# Patient Record
Sex: FEMALE | Race: OTHER | NOT HISPANIC OR LATINO
[De-identification: names, ages, dates, MRNs, and addresses within clinical notes are randomized per-mention and may not be internally consistent; named-entity substitution may affect disease eponyms.]

---

## 2017-01-09 ENCOUNTER — ASOB RESULT (OUTPATIENT)
Age: 32
End: 2017-01-09

## 2017-01-09 ENCOUNTER — APPOINTMENT (OUTPATIENT)
Dept: ANTEPARTUM | Facility: CLINIC | Age: 32
End: 2017-01-09

## 2017-02-27 ENCOUNTER — ASOB RESULT (OUTPATIENT)
Age: 32
End: 2017-02-27

## 2017-02-27 ENCOUNTER — APPOINTMENT (OUTPATIENT)
Dept: ANTEPARTUM | Facility: CLINIC | Age: 32
End: 2017-02-27

## 2017-07-13 ENCOUNTER — INPATIENT (INPATIENT)
Facility: HOSPITAL | Age: 32
LOS: 2 days | Discharge: HOME | End: 2017-07-16
Attending: OBSTETRICS & GYNECOLOGY | Admitting: OBSTETRICS & GYNECOLOGY

## 2017-07-13 DIAGNOSIS — Z3A.00 WEEKS OF GESTATION OF PREGNANCY NOT SPECIFIED: ICD-10-CM

## 2017-07-13 DIAGNOSIS — O26.899 OTHER SPECIFIED PREGNANCY RELATED CONDITIONS, UNSPECIFIED TRIMESTER: ICD-10-CM

## 2017-07-14 VITALS — HEIGHT: 66 IN | WEIGHT: 179.9 LBS

## 2017-07-14 LAB
BASOPHILS # BLD AUTO: 0.02 K/UL — SIGNIFICANT CHANGE UP (ref 0–0.2)
BASOPHILS NFR BLD AUTO: 0.1 % — SIGNIFICANT CHANGE UP (ref 0–2)
BLD GP AB SCN SERPL QL: NEGATIVE — SIGNIFICANT CHANGE UP
EOSINOPHIL # BLD AUTO: 0.02 K/UL — SIGNIFICANT CHANGE UP (ref 0–0.5)
EOSINOPHIL NFR BLD AUTO: 0.1 % — SIGNIFICANT CHANGE UP (ref 0–6)
HCT VFR BLD CALC: 41 % — SIGNIFICANT CHANGE UP (ref 34.5–45)
HGB BLD-MCNC: 14.1 G/DL — SIGNIFICANT CHANGE UP (ref 11.5–15.5)
IMM GRANULOCYTES # BLD AUTO: 0.05 # — SIGNIFICANT CHANGE UP
IMM GRANULOCYTES NFR BLD AUTO: 0.3 % — SIGNIFICANT CHANGE UP (ref 0–1.5)
LYMPHOCYTES # BLD AUTO: 1.24 K/UL — SIGNIFICANT CHANGE UP (ref 1–3.3)
LYMPHOCYTES # BLD AUTO: 8.2 % — LOW (ref 13–44)
MCHC RBC-ENTMCNC: 31.4 PG — SIGNIFICANT CHANGE UP (ref 27–34)
MCHC RBC-ENTMCNC: 34.4 % — SIGNIFICANT CHANGE UP (ref 32–36)
MCV RBC AUTO: 91.3 FL — SIGNIFICANT CHANGE UP (ref 80–100)
MONOCYTES # BLD AUTO: 0.51 K/UL — SIGNIFICANT CHANGE UP (ref 0–0.9)
MONOCYTES NFR BLD AUTO: 3.4 % — SIGNIFICANT CHANGE UP (ref 2–14)
NEUTROPHILS # BLD AUTO: 13.35 K/UL — HIGH (ref 1.8–7.4)
NEUTROPHILS NFR BLD AUTO: 87.9 % — HIGH (ref 43–77)
NRBC # FLD: 0 — SIGNIFICANT CHANGE UP
PLATELET # BLD AUTO: 219 K/UL — SIGNIFICANT CHANGE UP (ref 150–400)
PMV BLD: 11.3 FL — SIGNIFICANT CHANGE UP (ref 7–13)
RBC # BLD: 4.49 M/UL — SIGNIFICANT CHANGE UP (ref 3.8–5.2)
RBC # FLD: 13.1 % — SIGNIFICANT CHANGE UP (ref 10.3–14.5)
RH IG SCN BLD-IMP: POSITIVE — SIGNIFICANT CHANGE UP
T PALLIDUM AB TITR SER: NEGATIVE — SIGNIFICANT CHANGE UP
WBC # BLD: 15.19 K/UL — HIGH (ref 3.8–10.5)
WBC # FLD AUTO: 15.19 K/UL — HIGH (ref 3.8–10.5)

## 2017-07-14 RX ORDER — PRAMOXINE HYDROCHLORIDE 150 MG/15G
1 AEROSOL, FOAM RECTAL EVERY 4 HOURS
Qty: 0 | Refills: 0 | Status: DISCONTINUED | OUTPATIENT
Start: 2017-07-14 | End: 2017-07-16

## 2017-07-14 RX ORDER — DOCUSATE SODIUM 100 MG
100 CAPSULE ORAL
Qty: 0 | Refills: 0 | Status: DISCONTINUED | OUTPATIENT
Start: 2017-07-14 | End: 2017-07-16

## 2017-07-14 RX ORDER — OXYTOCIN 10 UNIT/ML
41.67 VIAL (ML) INJECTION
Qty: 20 | Refills: 0 | Status: DISCONTINUED | OUTPATIENT
Start: 2017-07-14 | End: 2017-07-16

## 2017-07-14 RX ORDER — IBUPROFEN 200 MG
600 TABLET ORAL EVERY 6 HOURS
Qty: 0 | Refills: 0 | Status: COMPLETED | OUTPATIENT
Start: 2017-07-14 | End: 2018-06-12

## 2017-07-14 RX ORDER — CITRIC ACID/SODIUM CITRATE 300-500 MG
15 SOLUTION, ORAL ORAL EVERY 4 HOURS
Qty: 0 | Refills: 0 | Status: DISCONTINUED | OUTPATIENT
Start: 2017-07-14 | End: 2017-07-14

## 2017-07-14 RX ORDER — SODIUM CHLORIDE 9 MG/ML
1000 INJECTION, SOLUTION INTRAVENOUS ONCE
Qty: 0 | Refills: 0 | Status: COMPLETED | OUTPATIENT
Start: 2017-07-14 | End: 2017-07-14

## 2017-07-14 RX ORDER — KETOROLAC TROMETHAMINE 30 MG/ML
30 SYRINGE (ML) INJECTION ONCE
Qty: 0 | Refills: 0 | Status: DISCONTINUED | OUTPATIENT
Start: 2017-07-14 | End: 2017-07-14

## 2017-07-14 RX ORDER — HYDROCORTISONE 1 %
1 OINTMENT (GRAM) TOPICAL EVERY 4 HOURS
Qty: 0 | Refills: 0 | Status: DISCONTINUED | OUTPATIENT
Start: 2017-07-14 | End: 2017-07-16

## 2017-07-14 RX ORDER — AER TRAVELER 0.5 G/1
1 SOLUTION RECTAL; TOPICAL EVERY 4 HOURS
Qty: 0 | Refills: 0 | Status: DISCONTINUED | OUTPATIENT
Start: 2017-07-14 | End: 2017-07-14

## 2017-07-14 RX ORDER — GLYCERIN ADULT
1 SUPPOSITORY, RECTAL RECTAL AT BEDTIME
Qty: 0 | Refills: 0 | Status: DISCONTINUED | OUTPATIENT
Start: 2017-07-14 | End: 2017-07-16

## 2017-07-14 RX ORDER — SODIUM CHLORIDE 9 MG/ML
3 INJECTION INTRAMUSCULAR; INTRAVENOUS; SUBCUTANEOUS EVERY 8 HOURS
Qty: 0 | Refills: 0 | Status: DISCONTINUED | OUTPATIENT
Start: 2017-07-14 | End: 2017-07-16

## 2017-07-14 RX ORDER — ACETAMINOPHEN 500 MG
975 TABLET ORAL EVERY 6 HOURS
Qty: 0 | Refills: 0 | Status: DISCONTINUED | OUTPATIENT
Start: 2017-07-14 | End: 2017-07-16

## 2017-07-14 RX ORDER — OXYCODONE HYDROCHLORIDE 5 MG/1
5 TABLET ORAL
Qty: 0 | Refills: 0 | Status: DISCONTINUED | OUTPATIENT
Start: 2017-07-14 | End: 2017-07-16

## 2017-07-14 RX ORDER — LANOLIN
1 OINTMENT (GRAM) TOPICAL EVERY 6 HOURS
Qty: 0 | Refills: 0 | Status: DISCONTINUED | OUTPATIENT
Start: 2017-07-14 | End: 2017-07-16

## 2017-07-14 RX ORDER — MAGNESIUM HYDROXIDE 400 MG/1
30 TABLET, CHEWABLE ORAL
Qty: 0 | Refills: 0 | Status: DISCONTINUED | OUTPATIENT
Start: 2017-07-14 | End: 2017-07-16

## 2017-07-14 RX ORDER — DIPHENHYDRAMINE HCL 50 MG
25 CAPSULE ORAL EVERY 6 HOURS
Qty: 0 | Refills: 0 | Status: DISCONTINUED | OUTPATIENT
Start: 2017-07-14 | End: 2017-07-16

## 2017-07-14 RX ORDER — DIBUCAINE 1 %
1 OINTMENT (GRAM) RECTAL EVERY 4 HOURS
Qty: 0 | Refills: 0 | Status: DISCONTINUED | OUTPATIENT
Start: 2017-07-14 | End: 2017-07-16

## 2017-07-14 RX ORDER — DIBUCAINE 1 %
1 OINTMENT (GRAM) RECTAL EVERY 4 HOURS
Qty: 0 | Refills: 0 | Status: DISCONTINUED | OUTPATIENT
Start: 2017-07-14 | End: 2017-07-14

## 2017-07-14 RX ORDER — SIMETHICONE 80 MG/1
80 TABLET, CHEWABLE ORAL EVERY 6 HOURS
Qty: 0 | Refills: 0 | Status: DISCONTINUED | OUTPATIENT
Start: 2017-07-14 | End: 2017-07-16

## 2017-07-14 RX ORDER — TETANUS TOXOID, REDUCED DIPHTHERIA TOXOID AND ACELLULAR PERTUSSIS VACCINE, ADSORBED 5; 2.5; 8; 8; 2.5 [IU]/.5ML; [IU]/.5ML; UG/.5ML; UG/.5ML; UG/.5ML
0.5 SUSPENSION INTRAMUSCULAR ONCE
Qty: 0 | Refills: 0 | Status: DISCONTINUED | OUTPATIENT
Start: 2017-07-14 | End: 2017-07-16

## 2017-07-14 RX ORDER — SODIUM CHLORIDE 9 MG/ML
3 INJECTION INTRAMUSCULAR; INTRAVENOUS; SUBCUTANEOUS EVERY 8 HOURS
Qty: 0 | Refills: 0 | Status: DISCONTINUED | OUTPATIENT
Start: 2017-07-14 | End: 2017-07-14

## 2017-07-14 RX ORDER — OXYTOCIN 10 UNIT/ML
41.67 VIAL (ML) INJECTION
Qty: 20 | Refills: 0 | Status: DISCONTINUED | OUTPATIENT
Start: 2017-07-14 | End: 2017-07-14

## 2017-07-14 RX ORDER — IBUPROFEN 200 MG
600 TABLET ORAL EVERY 6 HOURS
Qty: 0 | Refills: 0 | Status: DISCONTINUED | OUTPATIENT
Start: 2017-07-14 | End: 2017-07-16

## 2017-07-14 RX ORDER — HYDROCORTISONE 1 %
1 OINTMENT (GRAM) TOPICAL EVERY 4 HOURS
Qty: 0 | Refills: 0 | Status: DISCONTINUED | OUTPATIENT
Start: 2017-07-14 | End: 2017-07-14

## 2017-07-14 RX ORDER — OXYTOCIN 10 UNIT/ML
333.33 VIAL (ML) INJECTION
Qty: 20 | Refills: 0 | Status: COMPLETED | OUTPATIENT
Start: 2017-07-14

## 2017-07-14 RX ORDER — ACETAMINOPHEN 500 MG
975 TABLET ORAL EVERY 6 HOURS
Qty: 0 | Refills: 0 | Status: COMPLETED | OUTPATIENT
Start: 2017-07-14 | End: 2018-06-12

## 2017-07-14 RX ORDER — PRAMOXINE HYDROCHLORIDE 150 MG/15G
1 AEROSOL, FOAM RECTAL EVERY 4 HOURS
Qty: 0 | Refills: 0 | Status: DISCONTINUED | OUTPATIENT
Start: 2017-07-14 | End: 2017-07-14

## 2017-07-14 RX ORDER — SODIUM CHLORIDE 9 MG/ML
1000 INJECTION, SOLUTION INTRAVENOUS
Qty: 0 | Refills: 0 | Status: DISCONTINUED | OUTPATIENT
Start: 2017-07-14 | End: 2017-07-14

## 2017-07-14 RX ORDER — AER TRAVELER 0.5 G/1
1 SOLUTION RECTAL; TOPICAL EVERY 4 HOURS
Qty: 0 | Refills: 0 | Status: DISCONTINUED | OUTPATIENT
Start: 2017-07-14 | End: 2017-07-16

## 2017-07-14 RX ORDER — OXYCODONE HYDROCHLORIDE 5 MG/1
5 TABLET ORAL EVERY 4 HOURS
Qty: 0 | Refills: 0 | Status: DISCONTINUED | OUTPATIENT
Start: 2017-07-14 | End: 2017-07-16

## 2017-07-14 RX ORDER — OXYTOCIN 10 UNIT/ML
333.33 VIAL (ML) INJECTION
Qty: 20 | Refills: 0 | Status: COMPLETED | OUTPATIENT
Start: 2017-07-14 | End: 2017-07-14

## 2017-07-14 RX ADMIN — Medication 600 MILLIGRAM(S): at 20:30

## 2017-07-14 RX ADMIN — SODIUM CHLORIDE 3 MILLILITER(S): 9 INJECTION INTRAMUSCULAR; INTRAVENOUS; SUBCUTANEOUS at 22:15

## 2017-07-14 RX ADMIN — Medication 30 MILLIGRAM(S): at 11:04

## 2017-07-14 RX ADMIN — Medication 1000 MILLIUNIT(S)/MIN: at 10:43

## 2017-07-14 RX ADMIN — SODIUM CHLORIDE 125 MILLILITER(S): 9 INJECTION, SOLUTION INTRAVENOUS at 05:57

## 2017-07-14 RX ADMIN — SODIUM CHLORIDE 2000 MILLILITER(S): 9 INJECTION, SOLUTION INTRAVENOUS at 01:30

## 2017-07-14 RX ADMIN — Medication 600 MILLIGRAM(S): at 20:00

## 2017-07-14 RX ADMIN — Medication 125 MILLIUNIT(S)/MIN: at 10:44

## 2017-07-14 RX ADMIN — Medication 30 MILLIGRAM(S): at 11:20

## 2017-07-14 NOTE — DISCHARGE NOTE OB - CARE PROVIDER_API CALL
Georgina Duggan (MD), Obstetrics and Gynecology  [address] [place] [address]  Phone: [phone]  Fax: [phone]

## 2017-07-14 NOTE — DISCHARGE NOTE OB - PATIENT PORTAL LINK FT
“You can access the Follow[employer] Patient Portal, offered by [facility], by registering with the following website: [email or web address]”

## 2017-07-14 NOTE — DISCHARGE NOTE OB - MATERIALS PROVIDED
Shaken Baby Prevention Handout/Breastfeeding Log/MMR Vaccination (VIS Pub Date: 2012)/Birth Certificate Instructions/Guide to Postpartum Care/Tdap Vaccination (VIS Pub Date: 2012)/Vaccinations/[facility] [place] Screening Program/[place]  Immunization Record/[facility] Hearing Screen Program

## 2017-07-15 RX ADMIN — Medication 600 MILLIGRAM(S): at 10:57

## 2017-07-15 RX ADMIN — SODIUM CHLORIDE 3 MILLILITER(S): 9 INJECTION INTRAMUSCULAR; INTRAVENOUS; SUBCUTANEOUS at 22:00

## 2017-07-15 RX ADMIN — Medication 600 MILLIGRAM(S): at 17:01

## 2017-07-15 RX ADMIN — Medication 600 MILLIGRAM(S): at 17:31

## 2017-07-15 RX ADMIN — Medication 975 MILLIGRAM(S): at 23:08

## 2017-07-15 RX ADMIN — Medication 600 MILLIGRAM(S): at 01:51

## 2017-07-15 RX ADMIN — Medication 1 TABLET(S): at 14:35

## 2017-07-15 RX ADMIN — Medication 975 MILLIGRAM(S): at 15:02

## 2017-07-15 RX ADMIN — Medication 600 MILLIGRAM(S): at 10:27

## 2017-07-15 RX ADMIN — Medication 975 MILLIGRAM(S): at 14:32

## 2017-07-15 RX ADMIN — Medication 600 MILLIGRAM(S): at 02:20

## 2017-07-15 RX ADMIN — Medication 600 MILLIGRAM(S): at 23:08

## 2017-07-15 RX ADMIN — Medication 975 MILLIGRAM(S): at 23:40

## 2017-07-15 RX ADMIN — Medication 600 MILLIGRAM(S): at 23:40

## 2017-07-15 NOTE — PROGRESS NOTE ADULT - SUBJECTIVE AND OBJECTIVE BOX
Subjective  Pain: none  Complaints:none  Milestones: Alert and orientedx3. Out of bed ambulating. Voiding/Due to void. Tolerating a regular diet.  Infant feeding:                                 Feeding related issues or concerns:none    Objective   T(C): 36.5 (07-15-17 @ 06:00), Max: 37.1 (07-14-17 @ 14:44)  HR: 79 (07-15-17 @ 06:00) (79 - 96)  BP: 108/73 (07-15-17 @ 06:00) (105/66 - 115/71)  RR: 18 (07-15-17 @ 06:00) (17 - 18)  SpO2: 99% (07-15-17 @ 06:00) (99% - 100%)  Wt(kg): --    Breasts: soft, non-tender; no engorgement  Abd: Fundus firm; non-tender  Lochia:moderate  Lower extremities: no cyanosis, clubbing, or edema    LABS:                        14.1   15.19 )-----------( 219      ( 14 Jul 2017 01:37 )             41.0     ABO Interpretation: AB (07-14 @ 01:14)  Rh Interpretation: Positive (07-14 @ 01:14)          Plan: Increase ambulation, analgesia PRN and pain medication protocal standing oxycodone, ibuprofen and acetaminophen.  Diet: Continue regular diet      Continue routine postpartum care.

## 2017-07-15 NOTE — PROGRESS NOTE ADULT - SUBJECTIVE AND OBJECTIVE BOX
Anesthesia Post-op Note    POD#1 S/P     Patient is doing well.  OOBAA. Tolerating clears.  Pain is tolerable.  No residual anesthetic issues or complications noted.

## 2017-07-16 VITALS
TEMPERATURE: 98 F | OXYGEN SATURATION: 100 % | RESPIRATION RATE: 18 BRPM | SYSTOLIC BLOOD PRESSURE: 107 MMHG | DIASTOLIC BLOOD PRESSURE: 79 MMHG | HEART RATE: 93 BPM

## 2017-10-09 ENCOUNTER — RESULT REVIEW (OUTPATIENT)
Age: 32
End: 2017-10-09

## 2018-10-11 ENCOUNTER — RESULT REVIEW (OUTPATIENT)
Age: 33
End: 2018-10-11

## 2019-07-22 ENCOUNTER — ASOB RESULT (OUTPATIENT)
Age: 34
End: 2019-07-22

## 2019-07-22 ENCOUNTER — APPOINTMENT (OUTPATIENT)
Dept: ANTEPARTUM | Facility: CLINIC | Age: 34
End: 2019-07-22
Payer: COMMERCIAL

## 2019-07-22 PROCEDURE — 76813 OB US NUCHAL MEAS 1 GEST: CPT

## 2019-07-22 PROCEDURE — 36416 COLLJ CAPILLARY BLOOD SPEC: CPT

## 2019-07-22 PROCEDURE — 76801 OB US < 14 WKS SINGLE FETUS: CPT

## 2019-09-16 ENCOUNTER — APPOINTMENT (OUTPATIENT)
Dept: ANTEPARTUM | Facility: CLINIC | Age: 34
End: 2019-09-16
Payer: COMMERCIAL

## 2019-09-16 ENCOUNTER — ASOB RESULT (OUTPATIENT)
Age: 34
End: 2019-09-16

## 2019-09-16 PROCEDURE — 76811 OB US DETAILED SNGL FETUS: CPT

## 2020-01-22 ENCOUNTER — INPATIENT (INPATIENT)
Facility: HOSPITAL | Age: 35
LOS: 2 days | Discharge: HOME | End: 2020-01-25
Attending: OBSTETRICS & GYNECOLOGY | Admitting: OBSTETRICS & GYNECOLOGY

## 2020-01-22 VITALS
RESPIRATION RATE: 16 BRPM | SYSTOLIC BLOOD PRESSURE: 120 MMHG | TEMPERATURE: 98 F | DIASTOLIC BLOOD PRESSURE: 68 MMHG | HEART RATE: 88 BPM

## 2020-01-22 DIAGNOSIS — Z3A.00 WEEKS OF GESTATION OF PREGNANCY NOT SPECIFIED: ICD-10-CM

## 2020-01-22 DIAGNOSIS — O26.899 OTHER SPECIFIED PREGNANCY RELATED CONDITIONS, UNSPECIFIED TRIMESTER: ICD-10-CM

## 2020-01-22 RX ORDER — LEVOTHYROXINE SODIUM 125 MCG
1 TABLET ORAL
Qty: 0 | Refills: 0 | DISCHARGE

## 2020-01-22 RX ORDER — CITRIC ACID/SODIUM CITRATE 300-500 MG
15 SOLUTION, ORAL ORAL EVERY 6 HOURS
Refills: 0 | Status: DISCONTINUED | OUTPATIENT
Start: 2020-01-22 | End: 2020-01-23

## 2020-01-22 RX ORDER — SODIUM CHLORIDE 9 MG/ML
1000 INJECTION, SOLUTION INTRAVENOUS
Refills: 0 | Status: DISCONTINUED | OUTPATIENT
Start: 2020-01-22 | End: 2020-01-23

## 2020-01-22 RX ORDER — OXYTOCIN 10 UNIT/ML
333.33 VIAL (ML) INJECTION
Qty: 20 | Refills: 0 | Status: COMPLETED | OUTPATIENT
Start: 2020-01-22 | End: 2020-01-23

## 2020-01-22 NOTE — OB RN TRIAGE NOTE - PMH
Hypothyroidism     (normal spontaneous vaginal delivery)  FT 17 Hypothyroidism     (normal spontaneous vaginal delivery)  FT 17 8 lbs 5 oz

## 2020-01-23 ENCOUNTER — TRANSCRIPTION ENCOUNTER (OUTPATIENT)
Age: 35
End: 2020-01-23

## 2020-01-23 LAB
BASOPHILS # BLD AUTO: 0.05 K/UL — SIGNIFICANT CHANGE UP (ref 0–0.2)
BASOPHILS NFR BLD AUTO: 0.5 % — SIGNIFICANT CHANGE UP (ref 0–2)
BLD GP AB SCN SERPL QL: NEGATIVE — SIGNIFICANT CHANGE UP
EOSINOPHIL # BLD AUTO: 0.09 K/UL — SIGNIFICANT CHANGE UP (ref 0–0.5)
EOSINOPHIL NFR BLD AUTO: 0.9 % — SIGNIFICANT CHANGE UP (ref 0–6)
HCT VFR BLD CALC: 40.4 % — SIGNIFICANT CHANGE UP (ref 34.5–45)
HGB BLD-MCNC: 13.9 G/DL — SIGNIFICANT CHANGE UP (ref 11.5–15.5)
IMM GRANULOCYTES NFR BLD AUTO: 0.9 % — SIGNIFICANT CHANGE UP (ref 0–1.5)
LYMPHOCYTES # BLD AUTO: 2.19 K/UL — SIGNIFICANT CHANGE UP (ref 1–3.3)
LYMPHOCYTES # BLD AUTO: 20.7 % — SIGNIFICANT CHANGE UP (ref 13–44)
MCHC RBC-ENTMCNC: 32.1 PG — SIGNIFICANT CHANGE UP (ref 27–34)
MCHC RBC-ENTMCNC: 34.4 % — SIGNIFICANT CHANGE UP (ref 32–36)
MCV RBC AUTO: 93.3 FL — SIGNIFICANT CHANGE UP (ref 80–100)
MONOCYTES # BLD AUTO: 0.75 K/UL — SIGNIFICANT CHANGE UP (ref 0–0.9)
MONOCYTES NFR BLD AUTO: 7.1 % — SIGNIFICANT CHANGE UP (ref 2–14)
NEUTROPHILS # BLD AUTO: 7.38 K/UL — SIGNIFICANT CHANGE UP (ref 1.8–7.4)
NEUTROPHILS NFR BLD AUTO: 69.9 % — SIGNIFICANT CHANGE UP (ref 43–77)
NRBC # FLD: 0 K/UL — SIGNIFICANT CHANGE UP (ref 0–0)
PLATELET # BLD AUTO: 245 K/UL — SIGNIFICANT CHANGE UP (ref 150–400)
PMV BLD: 11.1 FL — SIGNIFICANT CHANGE UP (ref 7–13)
RBC # BLD: 4.33 M/UL — SIGNIFICANT CHANGE UP (ref 3.8–5.2)
RBC # FLD: 13.2 % — SIGNIFICANT CHANGE UP (ref 10.3–14.5)
RH IG SCN BLD-IMP: POSITIVE — SIGNIFICANT CHANGE UP
T PALLIDUM AB TITR SER: NEGATIVE — SIGNIFICANT CHANGE UP
WBC # BLD: 10.56 K/UL — HIGH (ref 3.8–10.5)
WBC # FLD AUTO: 10.56 K/UL — HIGH (ref 3.8–10.5)

## 2020-01-23 RX ORDER — ACETAMINOPHEN 500 MG
2 TABLET ORAL
Qty: 0 | Refills: 0 | DISCHARGE

## 2020-01-23 RX ORDER — OXYTOCIN 10 UNIT/ML
2 VIAL (ML) INJECTION
Qty: 30 | Refills: 0 | Status: DISCONTINUED | OUTPATIENT
Start: 2020-01-23 | End: 2020-01-23

## 2020-01-23 RX ORDER — SODIUM CHLORIDE 9 MG/ML
3 INJECTION INTRAMUSCULAR; INTRAVENOUS; SUBCUTANEOUS EVERY 8 HOURS
Refills: 0 | Status: DISCONTINUED | OUTPATIENT
Start: 2020-01-23 | End: 2020-01-25

## 2020-01-23 RX ORDER — PRAMOXINE HYDROCHLORIDE 150 MG/15G
1 AEROSOL, FOAM RECTAL EVERY 4 HOURS
Refills: 0 | Status: DISCONTINUED | OUTPATIENT
Start: 2020-01-23 | End: 2020-01-25

## 2020-01-23 RX ORDER — SIMETHICONE 80 MG/1
80 TABLET, CHEWABLE ORAL EVERY 4 HOURS
Refills: 0 | Status: DISCONTINUED | OUTPATIENT
Start: 2020-01-23 | End: 2020-01-25

## 2020-01-23 RX ORDER — LANOLIN
1 OINTMENT (GRAM) TOPICAL EVERY 6 HOURS
Refills: 0 | Status: DISCONTINUED | OUTPATIENT
Start: 2020-01-23 | End: 2020-01-25

## 2020-01-23 RX ORDER — IBUPROFEN 200 MG
600 TABLET ORAL EVERY 6 HOURS
Refills: 0 | Status: COMPLETED | OUTPATIENT
Start: 2020-01-23 | End: 2020-12-21

## 2020-01-23 RX ORDER — LEVOTHYROXINE SODIUM 125 MCG
25 TABLET ORAL DAILY
Refills: 0 | Status: DISCONTINUED | OUTPATIENT
Start: 2020-01-23 | End: 2020-01-25

## 2020-01-23 RX ORDER — IBUPROFEN 200 MG
1 TABLET ORAL
Qty: 0 | Refills: 0 | DISCHARGE

## 2020-01-23 RX ORDER — DIPHENHYDRAMINE HCL 50 MG
25 CAPSULE ORAL EVERY 6 HOURS
Refills: 0 | Status: DISCONTINUED | OUTPATIENT
Start: 2020-01-23 | End: 2020-01-25

## 2020-01-23 RX ORDER — BENZOCAINE 10 %
1 GEL (GRAM) MUCOUS MEMBRANE EVERY 6 HOURS
Refills: 0 | Status: DISCONTINUED | OUTPATIENT
Start: 2020-01-23 | End: 2020-01-25

## 2020-01-23 RX ORDER — GLYCERIN ADULT
1 SUPPOSITORY, RECTAL RECTAL AT BEDTIME
Refills: 0 | Status: DISCONTINUED | OUTPATIENT
Start: 2020-01-23 | End: 2020-01-25

## 2020-01-23 RX ORDER — TETANUS TOXOID, REDUCED DIPHTHERIA TOXOID AND ACELLULAR PERTUSSIS VACCINE, ADSORBED 5; 2.5; 8; 8; 2.5 [IU]/.5ML; [IU]/.5ML; UG/.5ML; UG/.5ML; UG/.5ML
0.5 SUSPENSION INTRAMUSCULAR ONCE
Refills: 0 | Status: DISCONTINUED | OUTPATIENT
Start: 2020-01-23 | End: 2020-01-25

## 2020-01-23 RX ORDER — KETOROLAC TROMETHAMINE 30 MG/ML
30 SYRINGE (ML) INJECTION ONCE
Refills: 0 | Status: DISCONTINUED | OUTPATIENT
Start: 2020-01-23 | End: 2020-01-23

## 2020-01-23 RX ORDER — ERGOCALCIFEROL 1.25 MG/1
1 CAPSULE ORAL
Qty: 0 | Refills: 0 | DISCHARGE

## 2020-01-23 RX ORDER — OXYCODONE HYDROCHLORIDE 5 MG/1
5 TABLET ORAL
Refills: 0 | Status: DISCONTINUED | OUTPATIENT
Start: 2020-01-23 | End: 2020-01-25

## 2020-01-23 RX ORDER — IBUPROFEN 200 MG
600 TABLET ORAL EVERY 6 HOURS
Refills: 0 | Status: DISCONTINUED | OUTPATIENT
Start: 2020-01-23 | End: 2020-01-25

## 2020-01-23 RX ORDER — MAGNESIUM HYDROXIDE 400 MG/1
30 TABLET, CHEWABLE ORAL
Refills: 0 | Status: DISCONTINUED | OUTPATIENT
Start: 2020-01-23 | End: 2020-01-25

## 2020-01-23 RX ORDER — ACETAMINOPHEN 500 MG
975 TABLET ORAL
Refills: 0 | Status: DISCONTINUED | OUTPATIENT
Start: 2020-01-23 | End: 2020-01-25

## 2020-01-23 RX ORDER — HYDROCORTISONE 1 %
1 OINTMENT (GRAM) TOPICAL EVERY 6 HOURS
Refills: 0 | Status: DISCONTINUED | OUTPATIENT
Start: 2020-01-23 | End: 2020-01-25

## 2020-01-23 RX ORDER — AER TRAVELER 0.5 G/1
1 SOLUTION RECTAL; TOPICAL EVERY 4 HOURS
Refills: 0 | Status: DISCONTINUED | OUTPATIENT
Start: 2020-01-23 | End: 2020-01-25

## 2020-01-23 RX ORDER — OXYCODONE HYDROCHLORIDE 5 MG/1
5 TABLET ORAL ONCE
Refills: 0 | Status: DISCONTINUED | OUTPATIENT
Start: 2020-01-23 | End: 2020-01-25

## 2020-01-23 RX ORDER — DIBUCAINE 1 %
1 OINTMENT (GRAM) RECTAL EVERY 6 HOURS
Refills: 0 | Status: DISCONTINUED | OUTPATIENT
Start: 2020-01-23 | End: 2020-01-25

## 2020-01-23 RX ADMIN — Medication 1 APPLICATION(S): at 20:43

## 2020-01-23 RX ADMIN — SODIUM CHLORIDE 3 MILLILITER(S): 9 INJECTION INTRAMUSCULAR; INTRAVENOUS; SUBCUTANEOUS at 14:40

## 2020-01-23 RX ADMIN — Medication 25 MICROGRAM(S): at 09:41

## 2020-01-23 RX ADMIN — Medication 975 MILLIGRAM(S): at 16:10

## 2020-01-23 RX ADMIN — AER TRAVELER 1 APPLICATION(S): 0.5 SOLUTION RECTAL; TOPICAL at 20:43

## 2020-01-23 RX ADMIN — Medication 600 MILLIGRAM(S): at 18:06

## 2020-01-23 RX ADMIN — Medication 1 TABLET(S): at 15:18

## 2020-01-23 RX ADMIN — Medication 975 MILLIGRAM(S): at 20:50

## 2020-01-23 RX ADMIN — Medication 600 MILLIGRAM(S): at 17:09

## 2020-01-23 RX ADMIN — Medication 1000 MILLIUNIT(S)/MIN: at 10:30

## 2020-01-23 RX ADMIN — PRAMOXINE HYDROCHLORIDE 1 APPLICATION(S): 150 AEROSOL, FOAM RECTAL at 20:43

## 2020-01-23 RX ADMIN — SODIUM CHLORIDE 3 MILLILITER(S): 9 INJECTION INTRAMUSCULAR; INTRAVENOUS; SUBCUTANEOUS at 21:36

## 2020-01-23 RX ADMIN — Medication 975 MILLIGRAM(S): at 15:18

## 2020-01-23 RX ADMIN — Medication 1 SPRAY(S): at 15:18

## 2020-01-23 RX ADMIN — Medication 975 MILLIGRAM(S): at 21:36

## 2020-01-23 RX ADMIN — Medication 600 MILLIGRAM(S): at 21:46

## 2020-01-23 RX ADMIN — Medication 2 MILLIUNIT(S)/MIN: at 06:27

## 2020-01-23 NOTE — OB PROVIDER TRIAGE NOTE - NSHPPHYSICALEXAM_GEN_ALL_CORE
Bp: 117/58  Hr:88  T:36.9  Abdomen: Soft, non -tender on palpation  SSE: Clear pooling, Positive Fern, Positive Nitrazine   SVE:1/50/-3  NST: +FHR  [place]: No ctx  Position Check: Vertex Bp: 117/58  Hr:88  T:36.9  Abdomen: Soft, non -tender on palpation  SSE: Clear pooling, Positive Fern, Positive Nitrazine   SVE:1/50/-3  NST: Reactive   [place]: No ctx  Position Check: Vertex

## 2020-01-23 NOTE — OB PROVIDER TRIAGE NOTE - NSOBPROVIDERNOTE_OBGYN_ALL_OB_FT
Evidence of PROM. Discussed with Dr. Jensen  -Full Admit See H&P Evidence of PROM. Discussed with Dr. Jensen:  -Full Admit to labor and delivery  -Routine orders placed  -PO Cytotec ordered for induction agent Evidence of PROM. Discussed with Dr. Jensen:  -Full Admit to labor and delivery, See H&P

## 2020-01-23 NOTE — DISCHARGE NOTE OB - CARE PLAN
Goal:	return to usual activities Goal:	return to usual activities  Assessment and plan of treatment:	PROM , Labor Induction ,  , Postpartum course

## 2020-01-23 NOTE — DISCHARGE NOTE OB - HOSPITAL COURSE
Admitted for PROM.  IOL with cytotec, pitocin.  Epidural for pain control. Pt of Dr. Jensen 35 y/o  @38.2 weeks gestation presents to triage with complaints of rupture of membranes, clear fluid @. Pt denies contractions- Admitted for PROM.  IOL with cytotec, pitocin.  Epidural for pain control.  Progressed and noted category 2 tracing short before delivery  Male infant Apgars 9/9   Postpartum course Pt of Dr. Jensen 35 y/o  @38.2 weeks gestation presents to triage with complaints of rupture of membranes, clear fluid @. Pt denies contractions- Admitted for PROM.  IOL with cytotec, pitocin.  Epidural for pain control.  Progressed and noted category 2 tracing short before delivery  Male infant Apgars 9/9   Postpartum course uncomplicated

## 2020-01-23 NOTE — CHART NOTE - NSCHARTNOTEFT_GEN_A_CORE
NP note    Pt examined for prolonged decel/ discontinuous tracing with moderate variability upon repositioning. Tracing recovered to baseline. Pt has some pressure but no urge to push.   Ctx q2-5min  SVE anterior lip/100/0  D/W Dr. Rodarte who is en route (ETA 15min)  Anticipate     kai, NP

## 2020-01-23 NOTE — DISCHARGE NOTE OB - ADDITIONAL INSTRUCTIONS
If heavy bleeding, severe pain , problems with perineal repair, shortness of breath , leg pain, fever,  or any other issues call doctor or return to hospital   Nothing per vagina or strenuous activity x 6 weeks Follow up in office for postpartum visit in 4-6 weeks

## 2020-01-23 NOTE — OB PROVIDER DELIVERY SUMMARY - NSLACERATRAPAIRDETAILS_OBGYN_ALL_OB_FT
2nd degree perineal laceration repaired with 2-0 Chromic and 2-0 Vycril sutures in layers with good restoration of anatomy and hemostasis

## 2020-01-23 NOTE — OB PROVIDER H&P - NSHPPHYSICALEXAM_GEN_ALL_CORE
Bp: 117/58  Hr:88  T:36.9  Abdomen: Soft, non -tender on palpation  SSE: Clear pooling, Positive Fern, Positive Nitrazine   SVE:1/50/-3  NST: Reactive  [place]: No ctx  Position Check: Vertex  EFW:3600  GBS Negative 1/6/2020

## 2020-01-23 NOTE — CHART NOTE - NSCHARTNOTEFT_GEN_A_CORE
R1 OB Progress Note    Patient seen and evaluated at bedside.  Denies complaints.    T(C): 36.3 (01-23-20 @ 03:32), Max: 36.9 (01-22-20 @ 22:13)  HR: 92 (01-23-20 @ 01:16) (88 - 92)  BP: 93/61 (01-23-20 @ 01:16) (93/61 - 120/68)  RR: 16 (01-23-20 @ 01:16) (16 - 16)  SpO2: --    SVE: 3/50/-2  EFM: 125m mod kimberly, + accels, - decels KATERYNA   [place]:  irregular    A/P 34y P1  admitted for PROM  -Labor: will start pitocin  -Fetus: KATERYNA  -GBS neg  -Analgesia: none    Kody Abreu PGY1  d/w

## 2020-01-23 NOTE — OB PROVIDER TRIAGE NOTE - HISTORY OF PRESENT ILLNESS
Pt of Dr. Jensen 35 y/o  @38.2 weeks gestation presents to triage with complaints of rupture of membranes, clear fluid @. Pt denies contractions. Pt denies vaginal bleeding. Pt reports good fetal movement.   Current Ap course uncomplicated  Medical H/x: Hypothyroidism  Surgical H/x: Denies  Ob/Gyn H/x: 2017//Full-Term/8lbs 5oz  Psych H/x: Denies  Meds: Levothyroxine 25mcg/daily, last dose 2020 @0500am  Allergies: Latex-Burning

## 2020-01-23 NOTE — OB PROVIDER H&P - ASSESSMENT
Pt of Dr. Jensen 35 y/o  @38.2 weeks gestation presents to triage with complaints of rupture of membranes, clear fluid @. Pt denies contractions. Pt denies vaginal bleeding. Pt reports good fetal movement.   Current Ap course uncomplicated  Medical H/x: Hypothyroidism  Surgical H/x: Denies  Ob/Gyn H/x: 2017//Full-Term/8lbs 5oz  Psych H/x: Denies  Meds: Levothyroxine 25mcg/daily, last dose 2020 @0500am  Allergies: Latex-Burning     Bp: 117/58  Hr:88  T:36.9  Abdomen: Soft, non -tender on palpation  SSE: Clear pooling, Positive Fern, Positive Nitrazine   SVE:/-3  NST: Reactive  [place]: No ctx  Position Check: Vertex  EFW:3600  GBS Negative 2020    Evidence of PROM. Discussed with Dr. Jensen:  -Full admit to labor and delivery  -Routine orders placed   -PO Cytotec ordered for induction agent

## 2020-01-23 NOTE — OB RN DELIVERY SUMMARY - NS_SEPSISRSKCALC_OBGYN_ALL_OB_FT
EOS calculated successfully. EOS Risk Factor: 0.5/1000 live births ([facility] national incidence); GA=38w3d; Temp=98.4; ROM=13.867; GBS='Negative'; Antibiotics='No antibiotics or any antibiotics < 2 hrs prior to birth'

## 2020-01-23 NOTE — DISCHARGE NOTE OB - PATIENT PORTAL LINK FT
You can access the FollowMyHealth Patient Portal offered by [facility] by registering at the following website: [email or web address]. By joining [employer]’s FollowMyHealth portal, you will also be able to view your health information using other applications (apps) compatible with our system.

## 2020-01-23 NOTE — OB PROVIDER DELIVERY SUMMARY - NSPROVIDERDELIVERYNOTE_OBGYN_ALL_OB_FT
Spontaneous vaginal delivery of liveborn infant from CHANDU position. Head, shoulders, and body delivered easily. Infant was suctioned. No mec. Delayed cord clamping and infant was passed to mother. Cord clamped and cut. Placenta delivered intact with a 3 vessel cord. Fundal massage was given and uterine fundus was found to be firm. Vaginal exam revealed an intact cervix, vaginal walls and sulci. Patient had a 2nd degree laceration in the perineum that was repaired with 2.0 chromic suture and 2.0 vicryl suture. Excellent hemostasis was noted. Patient was stable and went to recovery. Count was correct x 2.  Chao PGY1 Spontaneous vaginal delivery of liveborn infant from CHANDU position. Head, shoulders, and body delivered easily. Infant was suctioned. No mec. Delayed cord clamping and infant was passed to mother. Cord clamped and cut. Placenta delivered intact with a 3 vessel cord. Fundal massage was given and uterine fundus was found to be firm. Vaginal exam revealed an intact cervix, vaginal walls and sulci. Patient had a 2nd degree laceration in the perineum that was repaired with 2.0 chromic suture and 2.0 vicryl suture. Excellent hemostasis was noted. Patient was stable and went to recovery. Count was correct x 2.  Chao PGY1  I did delivery and repair with Dr Guidry. [address]

## 2020-01-23 NOTE — DISCHARGE NOTE OB - MEDICATION SUMMARY - MEDICATIONS TO TAKE
I will START or STAY ON the medications listed below when I get home from the hospital:    ibuprofen 200 mg oral tablet  -- 1 tab(s) by mouth every 6 hours, As Needed  -- Indication: For pain    Tylenol 500 mg oral tablet  -- 2 tab(s) by mouth every 6 hours, As Needed  -- Indication: For pain    PNV Prenatal oral tablet  -- 1 tab(s) by mouth once a day  -- Indication: For supplement    levothyroxine 25 mcg (0.025 mg) oral tablet  -- 1 tab(s) by mouth once a day  -- Indication: For Hypothyroidism

## 2020-01-24 RX ADMIN — Medication 25 MICROGRAM(S): at 07:04

## 2020-01-24 RX ADMIN — Medication 600 MILLIGRAM(S): at 00:30

## 2020-01-24 RX ADMIN — Medication 600 MILLIGRAM(S): at 08:39

## 2020-01-24 RX ADMIN — Medication 975 MILLIGRAM(S): at 06:30

## 2020-01-24 RX ADMIN — Medication 975 MILLIGRAM(S): at 05:29

## 2020-01-24 RX ADMIN — Medication 975 MILLIGRAM(S): at 18:50

## 2020-01-24 RX ADMIN — Medication 600 MILLIGRAM(S): at 21:16

## 2020-01-24 RX ADMIN — Medication 600 MILLIGRAM(S): at 01:26

## 2020-01-24 RX ADMIN — Medication 975 MILLIGRAM(S): at 12:29

## 2020-01-24 RX ADMIN — Medication 600 MILLIGRAM(S): at 15:17

## 2020-01-24 RX ADMIN — Medication 600 MILLIGRAM(S): at 09:30

## 2020-01-24 RX ADMIN — Medication 975 MILLIGRAM(S): at 23:56

## 2020-01-24 RX ADMIN — Medication 975 MILLIGRAM(S): at 19:40

## 2020-01-24 RX ADMIN — Medication 600 MILLIGRAM(S): at 16:10

## 2020-01-24 RX ADMIN — Medication 1 TABLET(S): at 12:29

## 2020-01-24 RX ADMIN — Medication 975 MILLIGRAM(S): at 13:22

## 2020-01-24 NOTE — PROGRESS NOTE ADULT - SUBJECTIVE AND OBJECTIVE BOX
S: Patient doing well.   Pain controlled.  +OOB.  +void.  Tolerating PO.  Lochia WNL.    O: Vital Signs Last 24 Hrs  T(C): 36.6 (2020 06:00), Max: 36.8 (2020 17:41)  T(F): 97.9 (2020 06:00), Max: 98.3 (2020 17:41)  HR: 86 (2020 06:00) (78 - 96)  BP: 97/61 (2020 06:00) (94/62 - 97/61)  BP(mean): --  RR: 16 (2020 06:00) (16 - 17)  SpO2: 100% (2020 06:00) (97% - 100%)    Gen: NAD  Abd: soft, NT, ND.   fundus firm below umbilicus, NT.  Ext: no tenderness B/L, negative Sharon's sign    Labs:                        13.9   10.56 )-----------( 245      ( 2020 00:50 )             40.4       ABO Interpretation: AB ( @ 23:58)    Rh Interpretation: Positive ( @ 23:58)    Antibody Screen Negative            A: 34y PPD#1 s/p  doing well.   -Continue routine postpartum care.  -Discharge planning.     MD Edin

## 2020-01-25 VITALS
OXYGEN SATURATION: 100 % | DIASTOLIC BLOOD PRESSURE: 74 MMHG | HEART RATE: 75 BPM | TEMPERATURE: 98 F | RESPIRATION RATE: 16 BRPM | SYSTOLIC BLOOD PRESSURE: 102 MMHG

## 2020-01-25 RX ADMIN — Medication 975 MILLIGRAM(S): at 06:25

## 2020-01-25 RX ADMIN — Medication 975 MILLIGRAM(S): at 00:25

## 2020-01-25 RX ADMIN — Medication 600 MILLIGRAM(S): at 03:45

## 2020-01-25 RX ADMIN — Medication 975 MILLIGRAM(S): at 05:54

## 2020-01-25 RX ADMIN — Medication 25 MICROGRAM(S): at 05:55

## 2020-01-25 RX ADMIN — Medication 600 MILLIGRAM(S): at 03:14

## 2021-03-01 NOTE — OB PROVIDER H&P - NSHPATTENDINGPLANDISCUSS_GEN_ALL_CORE
Patient reports she received Covid vaccine on 2/28. On 2/26 patient noticed some numbness and tingling in toes and fingers. This past Saturday patient noted to have legs and arms  started to tingle. Today numbness continues in finger and toe's but has started to have Numbness in nose and around mouth. Patient does not have Neurologist. Had Angela Nielson in 2008 and patient had same symptoms then. Instructed patient to go to ER. Dr. Jensen

## 2023-11-01 PROBLEM — E03.9 HYPOTHYROIDISM, UNSPECIFIED: Chronic | Status: ACTIVE | Noted: 2020-01-22

## 2023-11-03 ENCOUNTER — APPOINTMENT (OUTPATIENT)
Dept: INTERNAL MEDICINE | Facility: CLINIC | Age: 38
End: 2023-11-03

## 2024-05-21 NOTE — PATIENT PROFILE OB - SURGICAL SITE INCISION
